# Patient Record
Sex: FEMALE | Race: WHITE | URBAN - NONMETROPOLITAN AREA
[De-identification: names, ages, dates, MRNs, and addresses within clinical notes are randomized per-mention and may not be internally consistent; named-entity substitution may affect disease eponyms.]

---

## 2021-07-27 ENCOUNTER — OFFICE VISIT (OUTPATIENT)
Dept: PRIMARY CARE CLINIC | Age: 66
End: 2021-07-27
Payer: MEDICARE

## 2021-07-27 VITALS
BODY MASS INDEX: 33.47 KG/M2 | TEMPERATURE: 97.2 F | RESPIRATION RATE: 18 BRPM | HEART RATE: 94 BPM | HEIGHT: 69 IN | SYSTOLIC BLOOD PRESSURE: 128 MMHG | OXYGEN SATURATION: 98 % | DIASTOLIC BLOOD PRESSURE: 76 MMHG | WEIGHT: 226 LBS

## 2021-07-27 DIAGNOSIS — S39.012A LOW BACK STRAIN, INITIAL ENCOUNTER: Primary | ICD-10-CM

## 2021-07-27 DIAGNOSIS — M62.838 MUSCLE SPASM: ICD-10-CM

## 2021-07-27 DIAGNOSIS — M54.50 ACUTE BILATERAL LOW BACK PAIN WITHOUT SCIATICA: ICD-10-CM

## 2021-07-27 PROCEDURE — G8400 PT W/DXA NO RESULTS DOC: HCPCS | Performed by: NURSE PRACTITIONER

## 2021-07-27 PROCEDURE — 3017F COLORECTAL CA SCREEN DOC REV: CPT | Performed by: NURSE PRACTITIONER

## 2021-07-27 PROCEDURE — 1090F PRES/ABSN URINE INCON ASSESS: CPT | Performed by: NURSE PRACTITIONER

## 2021-07-27 PROCEDURE — 1123F ACP DISCUSS/DSCN MKR DOCD: CPT | Performed by: NURSE PRACTITIONER

## 2021-07-27 PROCEDURE — 99213 OFFICE O/P EST LOW 20 MIN: CPT | Performed by: NURSE PRACTITIONER

## 2021-07-27 PROCEDURE — 4040F PNEUMOC VAC/ADMIN/RCVD: CPT | Performed by: NURSE PRACTITIONER

## 2021-07-27 PROCEDURE — 1036F TOBACCO NON-USER: CPT | Performed by: NURSE PRACTITIONER

## 2021-07-27 PROCEDURE — G8427 DOCREV CUR MEDS BY ELIG CLIN: HCPCS | Performed by: NURSE PRACTITIONER

## 2021-07-27 PROCEDURE — G8417 CALC BMI ABV UP PARAM F/U: HCPCS | Performed by: NURSE PRACTITIONER

## 2021-07-27 RX ORDER — CELECOXIB 200 MG/1
CAPSULE ORAL
COMMUNITY
Start: 2021-05-27

## 2021-07-27 RX ORDER — TOBRAMYCIN AND DEXAMETHASONE 3; 1 MG/ML; MG/ML
SUSPENSION/ DROPS OPHTHALMIC
COMMUNITY
Start: 2021-07-01

## 2021-07-27 RX ORDER — METHOCARBAMOL 750 MG/1
750 TABLET, FILM COATED ORAL 4 TIMES DAILY
Qty: 40 TABLET | Refills: 0 | Status: SHIPPED | OUTPATIENT
Start: 2021-07-27 | End: 2021-08-06

## 2021-07-27 RX ORDER — TRAMADOL HYDROCHLORIDE 50 MG/1
TABLET ORAL
COMMUNITY
Start: 2021-07-14

## 2021-07-27 RX ORDER — FLUTICASONE PROPIONATE 50 MCG
SPRAY, SUSPENSION (ML) NASAL
COMMUNITY
Start: 2021-05-28

## 2021-07-27 RX ORDER — CITALOPRAM 40 MG/1
TABLET ORAL
COMMUNITY
Start: 2021-05-27

## 2021-07-27 ASSESSMENT — ENCOUNTER SYMPTOMS
CHEST TIGHTNESS: 0
NAUSEA: 0
BACK PAIN: 1

## 2021-07-27 NOTE — PATIENT INSTRUCTIONS
Low back pain /strain. Cold therapy and rest with limited ROM as discussed. Muscle relaxant as ordered, if no improvement can consider changing to alternate agent as discussed. If sudden or severe /worsening pain with any changes in bowel or bladder control please go to your nearest ER.

## 2021-07-27 NOTE — PROGRESS NOTES
[address] [facility] [address] [address]  Dept: [phone]  Dept Fax: [phone]: [phone]     Visit type: New patient    Reason for Visit: Lower Back Pain (Lower back pain x 1 day. Patient states she thinks its from a new float she used yesterday)      Assessment and Plan       1. Low back strain, initial encounter  -     methocarbamol (ROBAXIN) 750 MG tablet; Take 1 tablet by mouth 4 times daily for 10 days, Disp-40 tablet, R-0Normal  2. Acute bilateral low back pain without sciatica  -     methocarbamol (ROBAXIN) 750 MG tablet; Take 1 tablet by mouth 4 times daily for 10 days, Disp-40 tablet, R-0Normal  3. Muscle spasm  -     methocarbamol (ROBAXIN) 750 MG tablet; Take 1 tablet by mouth 4 times daily for 10 days, Disp-40 tablet, R-0Normal      ICD-10-CM    1. Low back strain, initial encounter  S39.012A methocarbamol (ROBAXIN) 750 MG tablet   2. Acute bilateral low back pain without sciatica  M54.5 methocarbamol (ROBAXIN) 750 MG tablet   3. Muscle spasm  M62.838 methocarbamol (ROBAXIN) 750 MG tablet         Return if symptoms worsen or fail to improve. Low back pain /strain. Cold therapy and rest with limited ROM as discussed. Muscle relaxant as ordered, if no improvement can consider changing to alternate agent as discussed. If sudden or severe /worsening pain with any changes in bowel or bladder control please go to your nearest ER. Patient is agreeable through shared decision making to this plan of care. Subjective       [place] that she tried out all day yesterday. She notes after getting off float had new low back pain that feels \"like it's in the muscle\" . She notes some spasm and knots in the muscle on the right lower back. She has tramadol for chronic pain/arthritis and took one of those this morning without much relief.   She has tried cold therapy. She has a hx of low back surgery around 2017 for disc problems. She notes no loss of bowel or bladder control, no foot drop, no numbness or tingling and  no radiation of pain with this complaint. She is a former nurse. She is afebrile and denies other associated symptoms. New activity made the pain worse and nothing so far has made it better. This began yesterday. Review of Systems   Constitutional: Negative for chills, fatigue and fever. Respiratory: Negative for chest tightness. Cardiovascular: Negative for chest pain. Gastrointestinal: Negative for nausea. Musculoskeletal: Positive for back pain (low back pain bilateral worse on right lumber) and myalgias (lumbar area). No Known Allergies    Outpatient Medications Prior to Visit   Medication Sig Dispense Refill    celecoxib (CELEBREX) 200 MG capsule TAKE 1 CAPSULE BY MOUTH DAILY      citalopram (CELEXA) 40 MG tablet TAKE 1 TABLET BY MOUTH EVERY DAY      fluticasone (FLONASE) 50 MCG/ACT nasal spray       tobramycin-dexamethasone (TOBRADEX) 0.3-0.1 % ophthalmic suspension INSTILL 1 DROP INTO LEFT EYE 4 TIMES DAILY FOR 7 DAYS      traMADol (ULTRAM) 50 MG tablet TAKE 1 TABLET BY MOUTH THREE TIMES DAILY AS NEEDED FOR SEVERE PAIN       No facility-administered medications prior to visit. Past Medical History:   Diagnosis Date    Anxiety         Social History     Tobacco Use    Smoking status: Former Smoker    Smokeless tobacco: Never Used   Substance Use Topics    Alcohol use: Not on file        Past Surgical History:   Procedure Laterality Date    APPENDECTOMY      BACK SURGERY      CHOLECYSTECTOMY      HIP SURGERY      SHOULDER SURGERY         History reviewed. No pertinent family history.     Objective       /76 (Site: Right Upper Arm, Position: Sitting)   Pulse 94   Temp 97.2 °F (36.2 °C) (Temporal)   Resp 18   Ht 5' 9\" (1.753 m)   Wt 226 lb (102.5 kg)   SpO2 98%   BMI 33.37 kg/m²   Physical Exam  Vitals and nursing note reviewed. Constitutional:       General: She is not in acute distress. Appearance: Normal appearance. She is not ill-appearing. HENT:      Head: Normocephalic. Eyes:      Pupils: Pupils are equal, round, and reactive to light. Cardiovascular:      Rate and Rhythm: Normal rate and regular rhythm. Pulmonary:      Effort: Pulmonary effort is normal.      Breath sounds: Normal breath sounds. Skin:     General: Skin is warm and dry. Neurological:      Mental Status: She is alert and oriented to person, place, and time. Gait: Gait is intact. Deep Tendon Reflexes:      Reflex Scores:       Patellar reflexes are 1+ on the right side and 1+ on the left side. Comments: Walking slowly due to pain. Obvious knot spasm right lumber area distal to spine. Similar slightly above the lumbar area on the left with spasm. There is no midline bony tenderness. There is no obvious discoloration or deformity.              Molly Vegas, APRN - CNP